# Patient Record
(demographics unavailable — no encounter records)

---

## 2024-12-16 NOTE — DISCUSSION/SUMMARY
[FreeTextEntry1] : pt will start on effexor and call me for a telehealth in a month  we disc pt for pelvic floor she had a mesh put in 2 yrs ago  monitor cycles for LMP and menopause mammo /sono

## 2024-12-16 NOTE — PHYSICAL EXAM

## 2024-12-16 NOTE — HISTORY OF PRESENT ILLNESS
[Currently Active] : currently active [Men] : men [Vaginal] : vaginal [No] : No [FreeTextEntry1] : 52 yo pt has migraines, she is also having terrible hot flashes and emotional ups and downs.  [BreastSonogramDate] : 3/23 [Mammogramdate] : 3/23 [PapSmeardate] : 2022 [ColonoscopyDate] : due for a colonoscopy